# Patient Record
Sex: FEMALE | Race: WHITE | NOT HISPANIC OR LATINO | Employment: FULL TIME | URBAN - METROPOLITAN AREA
[De-identification: names, ages, dates, MRNs, and addresses within clinical notes are randomized per-mention and may not be internally consistent; named-entity substitution may affect disease eponyms.]

---

## 2023-10-09 ENCOUNTER — OFFICE VISIT (OUTPATIENT)
Dept: PRIMARY CARE | Facility: CLINIC | Age: 23
End: 2023-10-09
Payer: COMMERCIAL

## 2023-10-09 VITALS
DIASTOLIC BLOOD PRESSURE: 76 MMHG | TEMPERATURE: 97.2 F | WEIGHT: 189.38 LBS | BODY MASS INDEX: 25.65 KG/M2 | HEART RATE: 93 BPM | OXYGEN SATURATION: 99 % | RESPIRATION RATE: 16 BRPM | HEIGHT: 72 IN | SYSTOLIC BLOOD PRESSURE: 129 MMHG

## 2023-10-09 DIAGNOSIS — L70.9 ACNE, UNSPECIFIED ACNE TYPE: ICD-10-CM

## 2023-10-09 DIAGNOSIS — Z00.00 HEALTH CARE MAINTENANCE: Primary | ICD-10-CM

## 2023-10-09 PROCEDURE — 1036F TOBACCO NON-USER: CPT | Performed by: FAMILY MEDICINE

## 2023-10-09 PROCEDURE — 90471 IMMUNIZATION ADMIN: CPT | Performed by: FAMILY MEDICINE

## 2023-10-09 PROCEDURE — 99385 PREV VISIT NEW AGE 18-39: CPT | Performed by: FAMILY MEDICINE

## 2023-10-09 PROCEDURE — 99385 PREV VISIT NEW AGE 18-39: CPT | Mod: 25 | Performed by: FAMILY MEDICINE

## 2023-10-09 RX ORDER — TRETINOIN 0.25 MG/G
GEL TOPICAL NIGHTLY
Qty: 45 G | Refills: 2 | Status: SHIPPED | OUTPATIENT
Start: 2023-10-09 | End: 2023-10-10 | Stop reason: SDUPTHER

## 2023-10-09 RX ORDER — BENZOYL PEROXIDE 5 G/100G
GEL TOPICAL 2 TIMES DAILY
Qty: 60 G | Refills: 5 | Status: SHIPPED | OUTPATIENT
Start: 2023-10-09 | End: 2024-06-05

## 2023-10-09 ASSESSMENT — PATIENT HEALTH QUESTIONNAIRE - PHQ9
1. LITTLE INTEREST OR PLEASURE IN DOING THINGS: NOT AT ALL
2. FEELING DOWN, DEPRESSED OR HOPELESS: NOT AT ALL
SUM OF ALL RESPONSES TO PHQ9 QUESTIONS 1 AND 2: 0

## 2023-10-09 ASSESSMENT — ENCOUNTER SYMPTOMS
DEPRESSION: 0
OCCASIONAL FEELINGS OF UNSTEADINESS: 0
LOSS OF SENSATION IN FEET: 0

## 2023-10-09 ASSESSMENT — PAIN SCALES - GENERAL: PAINLEVEL: 0-NO PAIN

## 2023-10-09 ASSESSMENT — COLUMBIA-SUICIDE SEVERITY RATING SCALE - C-SSRS
2. HAVE YOU ACTUALLY HAD ANY THOUGHTS OF KILLING YOURSELF?: NO
6. HAVE YOU EVER DONE ANYTHING, STARTED TO DO ANYTHING, OR PREPARED TO DO ANYTHING TO END YOUR LIFE?: NO
1. IN THE PAST MONTH, HAVE YOU WISHED YOU WERE DEAD OR WISHED YOU COULD GO TO SLEEP AND NOT WAKE UP?: NO

## 2023-10-09 NOTE — PROGRESS NOTES
"Subjective   Patient ID: Kj Jean-Baptiste is a 23 y.o. who presents for Annual Exam and Establish Care.  HPI    Kj Jean-Baptiste is a 23 y.o. year old here for an annual physical.    Concerns:     Acne- longstanding issue. Had a pill at one point, wasn't sure what it was. Topical tretinoin, benzoyl peroxide. Has run out of these recently     PMH: recurrent syncope (last episode 4 years ago). Was happening roughly every 2-3 years. Has had workup by cardiology. Has always been related to overheating, not eating enough  PSH: Mary D tooth extration   Family history: paternal grandfather with heart related issue, HLD in dad, thyroid issues. Maternal grandfather preDM, mom preDM.   Meds: None  Allergies: None  Social: no tobacco use, rare alcohol use, no drug use. 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 Not sexually active, identifies as straight, identifies as cisgender female    Diet: No concerns  Exercise: Working on increasing exercising   Tobacco: None  Alcohol: Rare use   Drugs: None  Sex: Not sexually active   LMP: 9/24  BIrth Control: none  Depression: PHQ2=0  IPV: Denies    The ASCVD Risk score (Magdalena BLACK, et al., 2019) failed to calculate for the following reasons:    The 2019 ASCVD risk score is only valid for ages 40 to 79  Risk factors: male, AA, older age, HTN, HLD, smoking, DM, FHx  Statin?: Low risk    Immunizations due: Flu shot today. Discussed covid vaccine     Breast Cancer  - FHx?: None    Cervical Cancer  - last pap: None    Colon Cancer  - FHx?: None    Lipid: Due  A1c: None prior, will get random glucose for screening  HIV (15-65): Would like to wait on this  HCV (18-79): Would like to wait on this         Review of Systems  10 point review of systems negative except as noted in HPI.    Objective     /76 (BP Location: Right arm, Patient Position: Sitting, BP Cuff Size: Adult)   Pulse 93   Temp 36.2 °C (97.2 °F)   Resp 16   Ht 1.905 m (6' 3\")   Wt 85.9 kg (189 lb 6 oz)   SpO2 " 99%   BMI 23.67 kg/m²   General: well appearing, no distress  Neck: No lymphadenopathy, no thyromegaly  CV: Regular rate and rhythm, no murmur  Lungs: Clear to auscultation bilaterally  Abdomen: Soft, nontender, nondistended  Extremities: No edema noted  Skin: Mild acne on face  Psych: Appropriate mood and affect     Assessment/Plan   22 yo here to establish care and for health maintenance visit    Health maintenance  - RTC for pap  - Flu shot today  - Labs as ordered    Acne  - Refill topical treatments  - Refer to dermatology

## 2023-10-09 NOTE — PATIENT INSTRUCTIONS
Thank you for coming in today! See below for dermatology scheduling information. I will see you back in 1 year or sooner as needed

## 2023-10-11 ENCOUNTER — CLINICAL SUPPORT (OUTPATIENT)
Dept: PRIMARY CARE | Facility: CLINIC | Age: 23
End: 2023-10-11
Payer: COMMERCIAL

## 2023-10-11 DIAGNOSIS — Z00.00 HEALTH CARE MAINTENANCE: Primary | ICD-10-CM

## 2023-10-11 DIAGNOSIS — Z00.00 HEALTH CARE MAINTENANCE: ICD-10-CM

## 2023-10-11 PROCEDURE — 36415 COLL VENOUS BLD VENIPUNCTURE: CPT

## 2023-10-11 PROCEDURE — 80048 BASIC METABOLIC PNL TOTAL CA: CPT

## 2023-10-11 PROCEDURE — 80061 LIPID PANEL: CPT

## 2023-10-12 LAB
ANION GAP SERPL CALC-SCNC: 13 MMOL/L (ref 10–20)
BUN SERPL-MCNC: 9 MG/DL (ref 6–23)
CALCIUM SERPL-MCNC: 9.2 MG/DL (ref 8.6–10.6)
CHLORIDE SERPL-SCNC: 104 MMOL/L (ref 98–107)
CHOLEST SERPL-MCNC: 179 MG/DL (ref 0–199)
CHOLESTEROL/HDL RATIO: 3.4
CO2 SERPL-SCNC: 24 MMOL/L (ref 21–32)
CREAT SERPL-MCNC: 0.75 MG/DL (ref 0.5–1.05)
GFR SERPL CREATININE-BSD FRML MDRD: >90 ML/MIN/1.73M*2
GLUCOSE SERPL-MCNC: 99 MG/DL (ref 74–99)
HDLC SERPL-MCNC: 53.3 MG/DL
LDLC SERPL CALC-MCNC: 114 MG/DL (ref 110–150)
NON HDL CHOLESTEROL: 126 MG/DL (ref 0–149)
POTASSIUM SERPL-SCNC: 3.8 MMOL/L (ref 3.5–5.3)
SODIUM SERPL-SCNC: 137 MMOL/L (ref 136–145)
TRIGL SERPL-MCNC: 60 MG/DL (ref 0–149)
VLDL: 12 MG/DL (ref 0–40)

## 2024-10-16 ENCOUNTER — OFFICE VISIT (OUTPATIENT)
Dept: PRIMARY CARE | Facility: CLINIC | Age: 24
End: 2024-10-16
Payer: COMMERCIAL

## 2024-10-16 VITALS
OXYGEN SATURATION: 100 % | RESPIRATION RATE: 16 BRPM | HEIGHT: 72 IN | DIASTOLIC BLOOD PRESSURE: 74 MMHG | SYSTOLIC BLOOD PRESSURE: 122 MMHG | TEMPERATURE: 96.5 F | BODY MASS INDEX: 27.25 KG/M2 | WEIGHT: 201.2 LBS | HEART RATE: 104 BPM

## 2024-10-16 DIAGNOSIS — Z00.00 ROUTINE HEALTH MAINTENANCE: Primary | ICD-10-CM

## 2024-10-16 PROCEDURE — 99395 PREV VISIT EST AGE 18-39: CPT | Performed by: FAMILY MEDICINE

## 2024-10-16 ASSESSMENT — ENCOUNTER SYMPTOMS
LOSS OF SENSATION IN FEET: 0
OCCASIONAL FEELINGS OF UNSTEADINESS: 0
DEPRESSION: 0

## 2024-10-16 ASSESSMENT — PATIENT HEALTH QUESTIONNAIRE - PHQ9
2. FEELING DOWN, DEPRESSED OR HOPELESS: NOT AT ALL
SUM OF ALL RESPONSES TO PHQ9 QUESTIONS 1 AND 2: 0
1. LITTLE INTEREST OR PLEASURE IN DOING THINGS: NOT AT ALL

## 2024-10-16 NOTE — PROGRESS NOTES
"Subjective   Patient ID: Kj Jean-Baptiste is a 24 y.o. who presents for Annual Exam.  HPI    Kj Jean-Baptiste is a 24 y.o. year old here for an annual physical.    Concerns:     Acne- longstanding issue. Last visit gave Rx for tretinoin and benzoyl peroxide. Working on balancing side effects from it and considering dermatology visit. She has referral from last year     Elevated BP reading  - No headaches, chest pain, shortness of breath, vision changes, dizziness  - Repeat reading was normal     Family history: paternal grandfather with heart related issue, HLD in dad, thyroid issues. Maternal grandfather preDM, mom preDM.   Social: no tobacco use, rare alcohol use, no drug use. 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 Not sexually active, identifies as straight, identifies as cisgender female    Diet: No concerns  Exercise: Working on increasing exercising, doing volleyball once a week.   Tobacco: None  Alcohol: Rare use   Drugs: None  Sex: Not sexually active   LMP: 9/17  BIrth Control: none  Depression: PHQ2=0  IPV: Denies    The ASCVD Risk score (Magdalena BLACK, et al., 2019) failed to calculate for the following reasons:    The 2019 ASCVD risk score is only valid for ages 40 to 79  Risk factors: male, AA, older age, HTN, HLD, smoking, DM, FHx  Statin?: Low risk    Immunizations due: None    Breast Cancer  - FHx?: None    Cervical Cancer  - last pap: None    Colon Cancer  - FHx?: None    Lipid: Due  A1c: None prior, will get random glucose for screening  HIV (15-65): Would like to wait on this  HCV (18-79): Would like to wait on this         Review of Systems  10 point review of systems negative except as noted in HPI.    Objective     /81   Pulse 104   Temp 35.8 °C (96.5 °F)   Resp 16   Ht 1.892 m (6' 2.5\")   Wt 91.3 kg (201 lb 3.2 oz)   SpO2 100%   BMI 25.49 kg/m²   General: well appearing, no distress  Neck: No lymphadenopathy, no thyromegaly  CV: Regular rate and rhythm, no murmur  Lungs: " Clear to auscultation bilaterally  Abdomen: Soft, nontender, nondistended  Extremities: No edema noted  Psych: Appropriate mood and affect     Assessment/Plan   23 yo here to establish care and for health maintenance visit    Health maintenance  - RTC for pap  - Discussed methods for increased exercise  - Anticipatory guidance

## 2025-02-05 ENCOUNTER — OFFICE VISIT (OUTPATIENT)
Dept: PRIMARY CARE | Facility: CLINIC | Age: 25
End: 2025-02-05
Payer: COMMERCIAL

## 2025-02-05 VITALS
BODY MASS INDEX: 27.85 KG/M2 | WEIGHT: 205.6 LBS | HEIGHT: 72 IN | TEMPERATURE: 97.6 F | SYSTOLIC BLOOD PRESSURE: 137 MMHG | HEART RATE: 87 BPM | DIASTOLIC BLOOD PRESSURE: 78 MMHG | OXYGEN SATURATION: 100 %

## 2025-02-05 DIAGNOSIS — Z12.4 ENCOUNTER FOR PAPANICOLAOU SMEAR FOR CERVICAL CANCER SCREENING: Primary | ICD-10-CM

## 2025-02-05 PROCEDURE — 99213 OFFICE O/P EST LOW 20 MIN: CPT | Performed by: FAMILY MEDICINE

## 2025-02-05 PROCEDURE — 1036F TOBACCO NON-USER: CPT | Performed by: FAMILY MEDICINE

## 2025-02-05 PROCEDURE — 3008F BODY MASS INDEX DOCD: CPT | Performed by: FAMILY MEDICINE

## 2025-02-05 ASSESSMENT — ENCOUNTER SYMPTOMS
OCCASIONAL FEELINGS OF UNSTEADINESS: 0
DEPRESSION: 0
LOSS OF SENSATION IN FEET: 0

## 2025-02-05 ASSESSMENT — PAIN SCALES - GENERAL: PAINLEVEL_OUTOF10: 0-NO PAIN

## 2025-02-05 ASSESSMENT — PATIENT HEALTH QUESTIONNAIRE - PHQ9
SUM OF ALL RESPONSES TO PHQ9 QUESTIONS 1 AND 2: 0
2. FEELING DOWN, DEPRESSED OR HOPELESS: NOT AT ALL
1. LITTLE INTEREST OR PLEASURE IN DOING THINGS: NOT AT ALL

## 2025-02-05 NOTE — PROGRESS NOTES
"Subjective   Patient ID: Kj Jean-Baptiste is a 24 y.o. who presents for Follow-up (Pap).  HPI    jK Jean-Baptiste is a 24 y.o. year old here for Pap    Pap smear  - No prior pap  - No concerns  - LMP 2 weeks ago  - Not sexually active     BP elevated on arrival today, which is typical for her. It improves on recheck     Review of Systems  10 point review of systems negative except as noted in HPI.    Objective     /78 (BP Location: Right arm, Patient Position: Sitting, BP Cuff Size: Adult)   Pulse 87   Temp 36.4 °C (97.6 °F) (Skin)   Ht 1.892 m (6' 2.5\")   Wt 93.3 kg (205 lb 9.6 oz)   LMP 01/22/2025 (Exact Date)   SpO2 100%   BMI 26.04 kg/m²   Gen: Well appearing, no acute distress  HEENT: Mucous membranes moist  Pulm: Respirations nonlabored  Psych: Normal mood and affect  : vulva with no lesions or masses; cervix no masses, no discharge, no CMT; bimanual exam- uterus no masses and nontender, no ovarian tenderness    Assessment/Plan   25 yo here for pap. Pap performed without issue. Will continue to monitor BP.  "

## 2025-02-19 LAB
CYTOLOGY CMNT CVX/VAG CYTO-IMP: NORMAL
LAB AP HPV GENOTYPE QUESTION: YES
LAB AP HPV HR: NORMAL
LABORATORY COMMENT REPORT: NORMAL
LMP START DATE: NORMAL
PATH REPORT.TOTAL CANCER: NORMAL

## 2025-09-08 ENCOUNTER — APPOINTMENT (OUTPATIENT)
Dept: PRIMARY CARE | Facility: CLINIC | Age: 25
End: 2025-09-08
Payer: COMMERCIAL

## 2025-10-06 ENCOUNTER — APPOINTMENT (OUTPATIENT)
Dept: PRIMARY CARE | Facility: CLINIC | Age: 25
End: 2025-10-06
Payer: COMMERCIAL